# Patient Record
Sex: FEMALE | Race: WHITE | NOT HISPANIC OR LATINO | Employment: FULL TIME | URBAN - NONMETROPOLITAN AREA
[De-identification: names, ages, dates, MRNs, and addresses within clinical notes are randomized per-mention and may not be internally consistent; named-entity substitution may affect disease eponyms.]

---

## 2020-04-19 ENCOUNTER — OFFICE VISIT (OUTPATIENT)
Dept: URGENT CARE | Facility: CLINIC | Age: 50
End: 2020-04-19
Payer: COMMERCIAL

## 2020-04-19 VITALS
HEIGHT: 64 IN | TEMPERATURE: 96.2 F | HEART RATE: 111 BPM | WEIGHT: 190 LBS | RESPIRATION RATE: 15 BRPM | OXYGEN SATURATION: 96 % | DIASTOLIC BLOOD PRESSURE: 88 MMHG | BODY MASS INDEX: 32.44 KG/M2 | SYSTOLIC BLOOD PRESSURE: 126 MMHG

## 2020-04-19 DIAGNOSIS — G51.0 BELL PALSY: ICD-10-CM

## 2020-04-19 PROCEDURE — 99203 OFFICE O/P NEW LOW 30 MIN: CPT | Performed by: NURSE PRACTITIONER

## 2020-04-19 RX ORDER — ATORVASTATIN CALCIUM 40 MG/1
40 TABLET, FILM COATED ORAL NIGHTLY
COMMUNITY

## 2020-04-19 RX ORDER — AMLODIPINE BESYLATE 5 MG/1
5 TABLET ORAL DAILY
COMMUNITY

## 2020-04-19 RX ORDER — VALACYCLOVIR HYDROCHLORIDE 500 MG/1
500 TABLET, FILM COATED ORAL 2 TIMES DAILY
Qty: 14 TAB | Refills: 0 | Status: SHIPPED | OUTPATIENT
Start: 2020-04-19 | End: 2020-04-26

## 2020-04-19 RX ORDER — VENLAFAXINE 37.5 MG/1
37.5 TABLET ORAL 2 TIMES DAILY
COMMUNITY

## 2020-04-19 RX ORDER — LISINOPRIL 20 MG/1
20 TABLET ORAL DAILY
COMMUNITY

## 2020-04-19 RX ORDER — LEVOTHYROXINE SODIUM 0.05 MG/1
50 TABLET ORAL
COMMUNITY
End: 2021-04-20

## 2020-04-19 RX ORDER — PIOGLITAZONEHYDROCHLORIDE 30 MG/1
30 TABLET ORAL DAILY
COMMUNITY

## 2020-04-19 RX ORDER — PREDNISONE 10 MG/1
TABLET ORAL
Qty: 45 TAB | Refills: 0 | Status: SHIPPED | OUTPATIENT
Start: 2020-04-19 | End: 2020-04-29

## 2020-04-19 ASSESSMENT — ENCOUNTER SYMPTOMS
NAUSEA: 0
NUMBNESS: 0
FATIGUE: 0
FEVER: 0
VOMITING: 0
DIAPHORESIS: 0
NECK PAIN: 0
VISUAL CHANGE: 1
DIZZINESS: 0
ANOREXIA: 0
LOSS OF CONSCIOUSNESS: 0
EYE PAIN: 0
DIARRHEA: 0
EYE DISCHARGE: 0
WEAKNESS: 0
HEADACHES: 0
SORE THROAT: 0
BLURRED VISION: 1
COUGH: 0
CHILLS: 0
ABDOMINAL PAIN: 0
DOUBLE VISION: 0
PHOTOPHOBIA: 0
HEARTBURN: 0
VERTIGO: 0
SENSORY CHANGE: 0
EYE REDNESS: 1
SWOLLEN GLANDS: 0
SPEECH CHANGE: 0
WEIGHT LOSS: 0

## 2020-04-19 NOTE — PROGRESS NOTES
Subjective:     Sulma Stauffer  is a 49 y.o. female who presents for Paralysis ((L) side face; )       Facial Injury   This is a new problem. The current episode started yesterday. The problem occurs constantly. The problem has been unchanged. Associated symptoms include a visual change. Pertinent negatives include no abdominal pain, anorexia, chills, coughing, diaphoresis, fatigue, fever, headaches, nausea, neck pain, numbness, rash, sore throat, swollen glands, urinary symptoms, vertigo, vomiting or weakness. Associated symptoms comments: 49-year-old female patient reports urgent care for new problem.  States that she woke up yesterday morning with left-sided facial droop.  Patient denies any pain or recent upper respiratory tract infection however does state that she went to ENT recently for sore on the roof of her mouth and is to return for follow-up visit in 2 weeks.  Patient denies any fever, change, nausea or vomiting denies any dizziness or headache but does admit to some visual changes in her left eye as she is unable to close her left eyelid and states that it was very red and dry when she woke up this morning.  Patient states that similar instances have run in her family and they were diagnosed with Bell palsy.  Patient has not taken anything over-the-counter for symptoms. Denies any issues with balance or strength in the upper or lower extremities. Denies ear pain or changes in hearing or hearing sensitivity. . Nothing aggravates the symptoms. She has tried nothing for the symptoms.     Review of Systems   Constitutional: Negative for chills, diaphoresis, fatigue, fever, malaise/fatigue and weight loss.   HENT: Negative for sore throat.    Eyes: Positive for blurred vision and redness. Negative for double vision, photophobia, pain and discharge.   Respiratory: Negative for cough.    Gastrointestinal: Negative for abdominal pain, anorexia, diarrhea, heartburn, nausea and vomiting.   Musculoskeletal:  "Negative for neck pain.   Skin: Negative for rash.   Neurological: Negative for dizziness, vertigo, sensory change, speech change, loss of consciousness, weakness, numbness and headaches.     History reviewed. No pertinent past medical history. History reviewed. No pertinent surgical history.   Social History     Socioeconomic History   • Marital status: Single     Spouse name: Not on file   • Number of children: Not on file   • Years of education: Not on file   • Highest education level: Not on file   Occupational History   • Not on file   Social Needs   • Financial resource strain: Not on file   • Food insecurity     Worry: Not on file     Inability: Not on file   • Transportation needs     Medical: Not on file     Non-medical: Not on file   Tobacco Use   • Smoking status: Current Every Day Smoker   • Smokeless tobacco: Never Used   Substance and Sexual Activity   • Alcohol use: Yes     Comment: occ   • Drug use: Not on file   • Sexual activity: Not on file   Lifestyle   • Physical activity     Days per week: Not on file     Minutes per session: Not on file   • Stress: Not on file   Relationships   • Social connections     Talks on phone: Not on file     Gets together: Not on file     Attends Gnosticism service: Not on file     Active member of club or organization: Not on file     Attends meetings of clubs or organizations: Not on file     Relationship status: Not on file   • Intimate partner violence     Fear of current or ex partner: Not on file     Emotionally abused: Not on file     Physically abused: Not on file     Forced sexual activity: Not on file   Other Topics Concern   • Not on file   Social History Narrative   • Not on file    Patient has no known allergies.     Objective:   /88 (BP Location: Right arm, Patient Position: Sitting)   Pulse (!) 111   Temp (!) 35.7 °C (96.2 °F) (Temporal)   Resp 15   Ht 1.626 m (5' 4\")   Wt 86.2 kg (190 lb)   SpO2 96%   BMI 32.61 kg/m²   Physical Exam  Vitals " signs reviewed.   Constitutional:       Appearance: Normal appearance.   HENT:      Right Ear: Tympanic membrane, ear canal and external ear normal.      Left Ear: Tympanic membrane, ear canal and external ear normal.      Nose: Nose normal.      Mouth/Throat:      Lips: Pink.      Mouth: Mucous membranes are moist.      Pharynx: Oropharynx is clear. Uvula midline.     Eyes:      General: No visual field deficit.     Extraocular Movements: Extraocular movements intact.      Conjunctiva/sclera: Conjunctivae normal.      Pupils: Pupils are equal, round, and reactive to light.   Cardiovascular:      Rate and Rhythm: Normal rate and regular rhythm.      Heart sounds: Normal heart sounds.   Pulmonary:      Effort: Pulmonary effort is normal.      Breath sounds: Normal breath sounds.   Skin:     General: Skin is warm.   Neurological:      Mental Status: She is alert and oriented to person, place, and time.      GCS: GCS eye subscore is 4. GCS verbal subscore is 5. GCS motor subscore is 6.      Cranial Nerves: Facial asymmetry present. No dysarthria.      Sensory: Sensation is intact.      Coordination: Coordination is intact.      Gait: Gait is intact.      Comments: Left sided facial droop, more pronounced in the mouth. Patient has difficulty closing left eyelid completely.    Psychiatric:         Mood and Affect: Mood normal.         Behavior: Behavior normal.         Thought Content: Thought content normal.         Judgment: Judgment normal.          Assessment/Plan:     1. Bell palsy  - predniSONE (DELTASONE) 10 MG Tab; Take six tablets daily for five days then five tablets for one day then four tablets daily then three tablets daily then two tablets daily and one tablet daily  Dispense: 45 Tab; Refill: 0  - valACYclovir (VALTREX) 500 MG Tab; Take 1 Tab by mouth 2 times a day for 7 days.  Dispense: 14 Tab; Refill: 0    Discussed physical examination findings as well as patient presentation and length of patient  symptoms is consistent with Bell palsy.  Will treat with valacyclovir as well as prednisone taper.  Instructed patient that I also want her to follow-up with ear nose and throat for further work-up and evaluation for lesion on the left side of her hard palate.  Also instructed patient to tape eye closed at night (tape provided) as well as get an eye patch to protect left eye due to slight droop.  Instructed patient to follow-up with primary care physician for further work-up and evaluation if symptoms persist.  Instructed patient that steroids do lower immunity and instructed her to stay at home and self isolate due to community spread of COVID-19.  Patient expressed understanding and agrees to plan     Supportive care, differential diagnoses, and indications for immediate follow-up discussed with patient.    Pathogenesis of diagnosis discussed including typical length and natural progression. Patient expresses understanding and agrees to plan.    Instructed patient to return to clinic for worsening symptoms or symptoms that persist for 7 to 10 days     Please note that this dictation was created using voice recognition software. I have made every reasonable attempt to correct obvious errors, but I expect that there are errors of grammar and possibly content that I did not discover before finalizing the note.

## 2021-04-20 ENCOUNTER — OFFICE VISIT (OUTPATIENT)
Dept: URGENT CARE | Facility: CLINIC | Age: 51
End: 2021-04-20
Payer: COMMERCIAL

## 2021-04-20 ENCOUNTER — APPOINTMENT (OUTPATIENT)
Dept: RADIOLOGY | Facility: IMAGING CENTER | Age: 51
End: 2021-04-20
Attending: NURSE PRACTITIONER
Payer: COMMERCIAL

## 2021-04-20 VITALS
DIASTOLIC BLOOD PRESSURE: 82 MMHG | BODY MASS INDEX: 35.65 KG/M2 | HEART RATE: 102 BPM | TEMPERATURE: 98.5 F | WEIGHT: 208.8 LBS | HEIGHT: 64 IN | SYSTOLIC BLOOD PRESSURE: 140 MMHG | RESPIRATION RATE: 16 BRPM | OXYGEN SATURATION: 95 %

## 2021-04-20 DIAGNOSIS — M25.471 PAIN AND SWELLING OF RIGHT ANKLE: ICD-10-CM

## 2021-04-20 DIAGNOSIS — M25.571 PAIN AND SWELLING OF RIGHT ANKLE: ICD-10-CM

## 2021-04-20 PROBLEM — D50.9 IRON DEFICIENCY ANEMIA: Status: ACTIVE | Noted: 2020-12-14

## 2021-04-20 PROBLEM — I10 ESSENTIAL HYPERTENSION: Status: ACTIVE | Noted: 2020-12-14

## 2021-04-20 PROBLEM — E78.00 PURE HYPERCHOLESTEROLEMIA: Status: ACTIVE | Noted: 2020-12-14

## 2021-04-20 PROBLEM — E66.01 CLASS 2 SEVERE OBESITY DUE TO EXCESS CALORIES WITH SERIOUS COMORBIDITY AND BODY MASS INDEX (BMI) OF 35.0 TO 35.9 IN ADULT (HCC): Status: ACTIVE | Noted: 2020-12-14

## 2021-04-20 PROBLEM — E03.9 ACQUIRED HYPOTHYROIDISM: Status: ACTIVE | Noted: 2020-12-14

## 2021-04-20 PROBLEM — F41.9 ANXIETY: Status: ACTIVE | Noted: 2020-12-14

## 2021-04-20 PROBLEM — E11.9 TYPE 2 DIABETES MELLITUS WITHOUT COMPLICATION, WITHOUT LONG-TERM CURRENT USE OF INSULIN (HCC): Status: ACTIVE | Noted: 2020-12-14

## 2021-04-20 PROBLEM — F17.210 TOBACCO DEPENDENCE DUE TO CIGARETTES: Status: ACTIVE | Noted: 2020-12-14

## 2021-04-20 PROCEDURE — 73610 X-RAY EXAM OF ANKLE: CPT | Mod: TC,RT | Performed by: NURSE PRACTITIONER

## 2021-04-20 PROCEDURE — 99204 OFFICE O/P NEW MOD 45 MIN: CPT | Performed by: NURSE PRACTITIONER

## 2021-04-20 RX ORDER — LEVOTHYROXINE SODIUM 0.07 MG/1
75 TABLET ORAL
COMMUNITY
Start: 2021-03-15

## 2021-04-20 RX ORDER — FERROUS SULFATE 325(65) MG
325 TABLET ORAL
COMMUNITY
Start: 2021-04-02 | End: 2021-04-20

## 2021-04-20 RX ORDER — ATORVASTATIN CALCIUM 40 MG/1
40 TABLET, FILM COATED ORAL DAILY
COMMUNITY
Start: 2021-03-15 | End: 2021-04-20

## 2021-04-20 RX ORDER — GABAPENTIN 100 MG/1
100 CAPSULE ORAL 3 TIMES DAILY
COMMUNITY
Start: 2021-04-06

## 2021-04-20 RX ORDER — TRAZODONE HYDROCHLORIDE 50 MG/1
50 TABLET ORAL
COMMUNITY
Start: 2021-03-15

## 2021-04-20 RX ORDER — HYDROCODONE BITARTRATE AND ACETAMINOPHEN 5; 325 MG/1; MG/1
1 TABLET ORAL EVERY 4 HOURS PRN
Qty: 12 TABLET | Refills: 0 | Status: SHIPPED | OUTPATIENT
Start: 2021-04-20 | End: 2021-04-23

## 2021-04-20 RX ORDER — ORAL SEMAGLUTIDE 14 MG/1
14 TABLET ORAL DAILY
COMMUNITY
Start: 2021-02-08 | End: 2021-05-09

## 2021-04-20 RX ORDER — MELOXICAM 15 MG/1
15 TABLET ORAL DAILY
Qty: 30 TABLET | Refills: 0 | Status: CANCELLED | OUTPATIENT
Start: 2021-04-20

## 2021-04-20 ASSESSMENT — ENCOUNTER SYMPTOMS
SORE THROAT: 0
CHILLS: 0
EYE PAIN: 0
MYALGIAS: 0
SHORTNESS OF BREATH: 0
NAUSEA: 0
FEVER: 0
VOMITING: 0
DIZZINESS: 0

## 2021-04-20 NOTE — PROGRESS NOTES
Subjective:   Sulma Stauffer is a 50 y.o. female who presents for Ankle Pain (R, painful X 3 days)      HPI   Patient is a 50-year-old female who presents the urgent care for evaluation of atraumatic right ankle pain and swelling has progressively worsened over the past 3 days.  Patient states that she has a history of pain in the right ankle which has been is evaluated by her PCP the beginning of this year.  She denies any acute trauma or injury.  She has had an MRI complete ruling out any significant etiology.  Patient has been resting, taking anti-inflammatories and had improvement in her pain and swelling up to 3 days ago.  She has been back to work which has exacerbated her pain as she is on her feet ambulating all day.  She attempted to wrap her ankle however this caused more pain and she took the wrap off.  She denies any numbness and tingling.  She continues to take gabapentin for neuropathy as she is a type II diabetic.  She denies any chest pain, palpitations, shortness of breath.  She does note some lower leg swelling however associates this to the change in her gait.  Denies any calf pain, redness.  Denies any history of DVT.    Review of Systems   Constitutional: Negative for chills and fever.   HENT: Negative for sore throat.    Eyes: Negative for pain.   Respiratory: Negative for shortness of breath.    Cardiovascular: Negative for chest pain.   Gastrointestinal: Negative for nausea and vomiting.   Genitourinary: Negative for hematuria.   Musculoskeletal: Positive for joint pain. Negative for myalgias.   Skin: Negative for rash.   Neurological: Negative for dizziness.       Medications:    • amLODIPine Tabs  • atorvastatin Tabs  • gabapentin Caps  • levothyroxine Tabs  • lisinopril Tabs  • metformin  • pioglitazone Tabs  • Rybelsus Tabs  • traZODone Tabs  • venlafaxine Tabs    Allergies: Patient has no known allergies.    Problem List: Sulma Stauffer has Acquired hypothyroidism; Anxiety; Class 2 severe  "obesity due to excess calories with serious comorbidity and body mass index (BMI) of 35.0 to 35.9 in adult (HCC); Essential hypertension; Iron deficiency anemia; Pure hypercholesterolemia; Tobacco dependence due to cigarettes; and Type 2 diabetes mellitus without complication, without long-term current use of insulin (HCC) on their problem list.    Surgical History:  No past surgical history on file.    Past Social Hx: Sulma Stauffer  reports that she has been smoking cigarettes. She has been smoking about 0.50 packs per day. She has never used smokeless tobacco. She reports current alcohol use. She reports previous drug use.     Past Family Hx:  Sulma Stauffer family history is not on file.     Problem list, medications, and allergies reviewed by myself today in Epic.     Objective:     /82 (BP Location: Right arm, Patient Position: Sitting, BP Cuff Size: Adult)   Pulse (!) 102   Temp 36.9 °C (98.5 °F) (Temporal)   Resp 16   Ht 1.619 m (5' 3.75\")   Wt 94.7 kg (208 lb 12.8 oz)   SpO2 95%   BMI 36.12 kg/m²     Physical Exam  Constitutional:       Appearance: Normal appearance. She is not ill-appearing or toxic-appearing.   HENT:      Head: Normocephalic.      Right Ear: External ear normal.      Left Ear: External ear normal.      Nose: Nose normal.      Mouth/Throat:      Lips: Pink.      Mouth: Mucous membranes are moist.   Eyes:      General: Lids are normal.         Right eye: No discharge.         Left eye: No discharge.   Pulmonary:      Effort: Pulmonary effort is normal. No accessory muscle usage or respiratory distress.   Musculoskeletal:      Cervical back: Full passive range of motion without pain.      Right lower leg: Normal. No tenderness or bony tenderness.      Right ankle: Swelling present. No deformity or ecchymosis. Tenderness present over the lateral malleolus. Normal range of motion.      Right Achilles Tendon: Normal.      Right foot: Normal.      Comments: Homans' sign negative "   Skin:     Coloration: Skin is not pale.   Neurological:      Mental Status: She is alert and oriented to person, place, and time.   Psychiatric:         Mood and Affect: Mood normal.         Thought Content: Thought content normal.         Assessment/Plan:     Diagnosis and associated orders:     1. Pain and swelling of right ankle  DX-ANKLE 3+ VIEWS RIGHT    REFERRAL TO ORTHOPEDICS    HYDROcodone-acetaminophen (NORCO) 5-325 MG Tab per tablet        Comments/MDM:     Well Criteria for predicting DVT:   0 = Active cancer   0 = Recent LE immobilization or casting/paralysis of LE   0 = Recently bedridden or major surgery (more than 3 days, or within 4wks)   0 = Localized tenderness over deep venous system   0 = Entire leg swollen  0 = Calf swelling >3cm compared to contralateral  0 = Pitting edema   0 = Collateral superficial veins (nonvaricose)     If an alternative dx as likely or more likely then DVT = (subtract) -2  Total score = 0      If 3 or greater = high probability   1 or 2 = moderate probability  •  0 or less = low probability      • I independently reviewed the patient's imaging and agree with the interpretation of the radiologist.    IMPRESSION:     Soft tissue swelling without acute bony abnormality   \  Achilles greater than plantar calcaneal spurring    Patient is a 50-year-old female who presented with the stated above, x-ray negative for fracture dislocation.  Patient does have spurring which may be attributed to her atraumatic swelling and pain.  I did review patient's chart for this encounter she did have a MRI completed of the right ankle 1/26/2021 which at this time indicated no significant findings.  At this time I encourage patient to rest, ice, elevate when at rest.  I did provide patient with a walking boot as she unable to tolerate an Ace bandage due to discomfort.  Will place referral to orthopedics for follow-up for further evaluation management as she does not appear to be improving with  conservative measures.  In prescribing controlled substances to this patient, I certify that I have obtained and reviewed the medical history of Sulma Stauffer. I have also made a good sarah effort to obtain applicable records from other providers who have treated the patient and records did not demonstrate any increased risk of substance abuse that would prevent me from prescribing controlled substances.     I have conducted a physical exam and documented it. I have reviewed Ms. Stauffer’s prescription history as maintained by the [place] Prescription Monitoring Program.     I have assessed the patient’s risk for abuse, dependency, and addiction using the validated Opioid Risk Tool available at https://www.mdcalc.com/xrpplo-wsri-aqbc-ort-narcotic-abuse.     Given the above, I believe the benefits of controlled substance therapy outweigh the risks. The reasons for prescribing controlled substances include non-narcotic, oral analgesic alternatives have been inadequate for pain control. Accordingly, I have discussed the risk and benefits, treatment plan, and alternative therapies with the patient.   Advised the patient to follow-up with the primary care physician for recheck, reevaluation, and consideration of further management. Differential diagnosis, natural history, supportive care, and indications for immediate follow-up discussed.          Please note that this dictation was created using voice recognition software. I have made a reasonable attempt to correct obvious errors, but I expect that there are errors of grammar and possibly content that I did not discover before finalizing the note.    This note was electronically signed by Palmer GUERRERO.

## 2021-04-20 NOTE — LETTER
April 20, 2021         Patient: Sulma Stauffer   YOB: 1970   Date of Visit: 4/20/2021           To Whom it May Concern:    Sulma Stauffer was seen in my clinic on 4/20/2021. She may return to work on 4/23/21.    If you have any questions or concerns, please don't hesitate to call.        Sincerely,           NANO Box  Electronically Signed

## 2021-10-14 PROBLEM — E78.2 MIXED HYPERLIPIDEMIA: Status: ACTIVE | Noted: 2020-12-14

## 2021-11-19 ENCOUNTER — NON-PROVIDER VISIT (OUTPATIENT)
Dept: NEUROLOGY | Facility: MEDICAL CENTER | Age: 51
End: 2021-11-19
Attending: PSYCHIATRY & NEUROLOGY
Payer: COMMERCIAL

## 2021-11-19 DIAGNOSIS — M79.671 RIGHT FOOT PAIN: ICD-10-CM

## 2021-11-19 PROCEDURE — 95909 NRV CNDJ TST 5-6 STUDIES: CPT | Mod: 26 | Performed by: SPECIALIST

## 2021-11-19 PROCEDURE — 95886 MUSC TEST DONE W/N TEST COMP: CPT | Performed by: SPECIALIST

## 2021-11-19 PROCEDURE — 95909 NRV CNDJ TST 5-6 STUDIES: CPT | Performed by: SPECIALIST

## 2021-11-19 PROCEDURE — 95886 MUSC TEST DONE W/N TEST COMP: CPT | Mod: 26 | Performed by: SPECIALIST

## 2021-11-19 NOTE — PROCEDURES
NERVE CONDUCTION STUDIES AND ELECTROMYOGRAPHY REPORT        11/19/21      Referring provider: Pcp Not In Computer      SUMMARY OF PATIENT'S CLINICAL HISTORY,PHYSICAL EXAM, AND RATIONALE FOR TESTING:    Ms. Sulma Stauffer 50 y.o. presenting with bilateral lower extremity pain worse when she is up on her feet in a patient with a history of diabetes.    Past Medical History is significant for : No past medical history on file.    The electrodiagnostic studies were performed to evaluate for possible neuropathy.      ELECTRODIAGNOSTIC EXAMINATION:  Nerve conduction studies (NCS) and electromyography (EMG) are utilized to evaluate direct or indirect damage to the peripheral nervous system. NCS are performed to measure the nerve(s) response(s) to electrostimulation across a given nerve segment. EMG evaluates the passive and active electrical activity of the muscle(s) in question.  Muscles are innervated by specific peripheral nerves and roots. Often times, several nerves the muscle to be examined in order to determine the presence or absence of the disease process. Furthermore, nerves and muscles may need to be tested in a [id number] comparison, as well as in additional extremities, as this may be crucial in characterizing the extent of the disease process, which may be diffuse or isolated and of varying degree of severity. The extent of the neurodiagnostic exam is justified as it may help arrive to a proper diagnosis, which ultimately may contribute to better management of the patient. Therefore, the nerves to muscles examined during the study were medically necessary.    Unless otherwise noted, temperature of the extremity(s) study was monitored before and during the examination and remained between 32 and 36 degrees C for the upper extremities, and between 30 and 36 degrees C for the lower extremities.      NERVE CONDUCTION STUDIES:  Sensory nerves:   - Bilateral Sural nerves were tested. The responses were within  normal limits.    Motor nerves:   - Bilateral Tibial nerves were examined. Recording electrodes placed at the Abductor     Hallucis muscles. The responses were within normal limits.  - Bilateral Deep Peroneal motor nerves were examined. Recording electrodes were placed at the Extensor Digitorum Brevis muscles.The responses were within normal limits.     No temporal dispersion or conduction block observed in any of the motor nerves examined.      ELECTROMYOGRAPHY:  The study was performed the concentric needle electrode. Fibrillation and fasciculation activity is graded by convention from none (0) to continuous (4+).  Needle electrode examination was performed in the following muscles: Bilateral vastus lateralis, tibialis anterior, gastrocnemius, extensor digitorum brevis, abductor hallucis.  The muscles tested demonstrated normal insertional activity, normal motor unit morphology and recruitment. There were no elements suggestive of active denervation.    Nerve Conduction Studies     Stim Site NR Onset (ms) Norm Onset (ms) O-P Amp (µV) Norm O-P Amp Site1 Site2 Delta-P (ms) Dist (cm) Franko (m/s) Norm Franko (m/s)   Left Sural Anti Sensory (Lat Mall)   Calf    2.6 <4.6 21.8 >4 Calf Lat Mall 3.4 14.0 41 >40   Right Sural Anti Sensory (Lat Mall)   Calf    2.6 <4.6 26.8 >4 Calf Lat Mall 3.4 14.0 41 >40        Stim Site NR Onset (ms) Norm Onset (ms) O-P Amp (mV) Norm O-P Amp Site1 Site2 Delta-0 (ms) Dist (cm) Franko (m/s) Norm Franko (m/s)   Left Peroneal EDB Motor (Ext Dig Brev)   Ankle    3.2 <6 5.4 >2.5 B Fib Ankle 6.8 35.0 51 >40   B Fib    10.0  3.8  Poplt B Fib 1.2 10.0 83    Poplt    11.2  3.1          Right Peroneal EDB Motor (Ext Dig Brev)   Ankle    3.6 <6 4.0 >2.5 B Fib Ankle 6.5 34.0 52 >40   B Fib    10.1  3.5  Poplt B Fib 1.2 10.0 83    Poplt    11.3  3.7          Left Tibial Motor (Abd Rubio Brev)   Ankle    3.4 <6 9.7 >4 Knee Ankle 6.4 37.0 58 >40   Knee    9.8  3.0          Right Tibial Motor (Abd Rubio Brev)   Ankle     3.1 <6 10.0 >4 Knee Ankle 6.4 37.0 58 >40   Knee    9.5  1.0                          Electromyography     Side Muscle Nerve Root Ins Act Fibs Psw Amp Dur Poly Recrt Int Pat Comment   Left VastusLat Femoral L2-4 Nml Nml Nml Nml Nml 0 Nml Nml    Left AntTibialis Dp Br Fibular L4-5 Nml Nml Nml Nml Nml 0 Nml Nml    Left Gastroc Tibial S1-2 Nml Nml Nml Nml Nml 0 Nml Nml    Left Ext Dig Brev Dp Br Fibular L5, S1 Nml Nml Nml Nml Nml 0 Nml Nml    Left AbdHallucis MedPlantar S1-2 Nml Nml Nml Nml Nml 0 Nml Nml    Right VastusLat Femoral L2-4 Nml Nml Nml Nml Nml 0 Nml Nml    Right AntTibialis Dp Br Fibular L4-5 Nml Nml Nml Nml Nml 0 Nml Nml    Right Gastroc Tibial S1-2 Nml Nml Nml Nml Nml 0 Nml Nml    Right Ext Dig Brev Dp Br Fibular L5, S1 Nml Nml Nml Nml Nml 0 Nml Nml    Right AbdHallucis MedPlantar S1-2 Nml Nml Nml Nml Nml 0 Nml Nml            DIAGNOSTIC INTERPRETATION:   Extensive electrodiagnostic studies were performed to the bilateral lower extremities.  The results are as follows:    1.  Normal EMG and nerve conduction study left lower extremity.    2.  Normal EMG and nerve conduction study right lower extremity.            SHAHNAZ Salazar M.D.